# Patient Record
Sex: FEMALE | Race: WHITE
[De-identification: names, ages, dates, MRNs, and addresses within clinical notes are randomized per-mention and may not be internally consistent; named-entity substitution may affect disease eponyms.]

---

## 2017-10-03 ENCOUNTER — CHARTING TRANS (OUTPATIENT)
Dept: OTHER | Age: 37
End: 2017-10-03

## 2018-01-23 ENCOUNTER — CHARTING TRANS (OUTPATIENT)
Dept: OTHER | Age: 38
End: 2018-01-23

## 2018-01-25 ENCOUNTER — CHARTING TRANS (OUTPATIENT)
Dept: OTHER | Age: 38
End: 2018-01-25

## 2018-11-02 VITALS
RESPIRATION RATE: 18 BRPM | HEART RATE: 82 BPM | HEIGHT: 65 IN | WEIGHT: 160 LBS | BODY MASS INDEX: 26.66 KG/M2 | DIASTOLIC BLOOD PRESSURE: 60 MMHG | SYSTOLIC BLOOD PRESSURE: 104 MMHG | TEMPERATURE: 99.6 F

## 2022-11-14 ENCOUNTER — WALK IN (OUTPATIENT)
Dept: URGENT CARE | Age: 42
End: 2022-11-14

## 2022-11-14 VITALS
BODY MASS INDEX: 33.32 KG/M2 | HEIGHT: 65 IN | DIASTOLIC BLOOD PRESSURE: 80 MMHG | RESPIRATION RATE: 18 BRPM | HEART RATE: 77 BPM | WEIGHT: 200 LBS | OXYGEN SATURATION: 100 % | SYSTOLIC BLOOD PRESSURE: 120 MMHG | TEMPERATURE: 98.8 F

## 2022-11-14 DIAGNOSIS — R10.30 LOWER ABDOMINAL PAIN: Primary | ICD-10-CM

## 2022-11-14 DIAGNOSIS — J06.9 UPPER RESPIRATORY TRACT INFECTION, UNSPECIFIED TYPE: ICD-10-CM

## 2022-11-14 DIAGNOSIS — R19.7 DIARRHEA, UNSPECIFIED TYPE: ICD-10-CM

## 2022-11-14 PROCEDURE — 99202 OFFICE O/P NEW SF 15 MIN: CPT | Performed by: NURSE PRACTITIONER

## 2022-11-14 RX ORDER — ONDANSETRON 4 MG/1
TABLET, ORALLY DISINTEGRATING ORAL
COMMUNITY
Start: 2022-08-17

## 2022-11-14 ASSESSMENT — ENCOUNTER SYMPTOMS
SINUS PAIN: 1
CHILLS: 1
RHINORRHEA: 1
COUGH: 0
CHEST TIGHTNESS: 0
WHEEZING: 0
SHORTNESS OF BREATH: 0
NAUSEA: 1
HEADACHES: 1
APPETITE CHANGE: 1
SORE THROAT: 0
BACK PAIN: 1
VOMITING: 0
TROUBLE SWALLOWING: 0
LIGHT-HEADEDNESS: 1
ABDOMINAL PAIN: 1
DIARRHEA: 1
BLOOD IN STOOL: 0
FEVER: 0
FATIGUE: 1
SINUS PRESSURE: 1

## 2023-08-25 ENCOUNTER — OFFICE VISIT (OUTPATIENT)
Dept: OCCUPATIONAL MEDICINE | Age: 43
End: 2023-08-25
Attending: PHYSICIAN ASSISTANT

## 2023-08-28 ENCOUNTER — OFFICE VISIT (OUTPATIENT)
Dept: OCCUPATIONAL MEDICINE | Age: 43
End: 2023-08-28
Attending: PHYSICIAN ASSISTANT

## 2023-09-01 ENCOUNTER — OFFICE VISIT (OUTPATIENT)
Dept: OCCUPATIONAL MEDICINE | Age: 43
End: 2023-09-01
Attending: PHYSICIAN ASSISTANT

## 2023-09-05 ENCOUNTER — OFFICE VISIT (OUTPATIENT)
Dept: OCCUPATIONAL MEDICINE | Age: 43
End: 2023-09-05
Attending: PHYSICIAN ASSISTANT

## 2023-09-07 ENCOUNTER — OFFICE VISIT (OUTPATIENT)
Dept: OCCUPATIONAL MEDICINE | Age: 43
End: 2023-09-07
Attending: PHYSICIAN ASSISTANT

## 2023-09-09 ENCOUNTER — OFFICE VISIT (OUTPATIENT)
Dept: OCCUPATIONAL MEDICINE | Age: 43
End: 2023-09-09
Attending: FAMILY MEDICINE

## 2024-12-17 ENCOUNTER — TELEPHONE (OUTPATIENT)
Facility: LOCATION | Age: 44
End: 2024-12-17

## 2024-12-17 ENCOUNTER — OFFICE VISIT (OUTPATIENT)
Facility: LOCATION | Age: 44
End: 2024-12-17
Payer: COMMERCIAL

## 2024-12-17 DIAGNOSIS — J32.9 CHRONIC SINUSITIS, UNSPECIFIED LOCATION: Primary | ICD-10-CM

## 2024-12-17 DIAGNOSIS — R09.81 NASAL CONGESTION: ICD-10-CM

## 2024-12-17 PROCEDURE — 77011 CT SCAN FOR LOCALIZATION: CPT | Performed by: OTOLARYNGOLOGY

## 2024-12-17 PROCEDURE — 99204 OFFICE O/P NEW MOD 45 MIN: CPT | Performed by: OTOLARYNGOLOGY

## 2024-12-17 RX ORDER — FLUTICASONE PROPIONATE 50 MCG
2 SPRAY, SUSPENSION (ML) NASAL DAILY
Qty: 16 G | Refills: 3 | Status: SHIPPED | OUTPATIENT
Start: 2024-12-17

## 2024-12-17 RX ORDER — FLUTICASONE PROPIONATE 50 MCG
2 SPRAY, SUSPENSION (ML) NASAL DAILY
Qty: 16 G | Refills: 3 | Status: SHIPPED | OUTPATIENT
Start: 2024-12-17 | End: 2024-12-17

## 2024-12-17 RX ORDER — CEFUROXIME AXETIL 250 MG/1
250 TABLET ORAL 2 TIMES DAILY
Qty: 20 TABLET | Refills: 0 | Status: SHIPPED | OUTPATIENT
Start: 2024-12-17 | End: 2024-12-17

## 2024-12-17 RX ORDER — PREDNISONE 5 MG/1
5 TABLET ORAL DAILY
Qty: 14 TABLET | Refills: 0 | Status: SHIPPED | OUTPATIENT
Start: 2024-12-17

## 2024-12-17 RX ORDER — PREDNISONE 5 MG/1
5 TABLET ORAL DAILY
Qty: 14 TABLET | Refills: 0 | Status: SHIPPED | OUTPATIENT
Start: 2024-12-17 | End: 2024-12-17

## 2024-12-17 RX ORDER — CEFUROXIME AXETIL 250 MG/1
250 TABLET ORAL 2 TIMES DAILY
Qty: 20 TABLET | Refills: 0 | Status: SHIPPED | OUTPATIENT
Start: 2024-12-17

## 2024-12-17 NOTE — TELEPHONE ENCOUNTER
Pt coming in for an office visit visit today presenting sinus issues. Doctor would like Pt to under go a CT scan of the sinus. Per [employer] using Pt's insurance information. No authorization is required. Customer ID: [id number]

## 2024-12-18 NOTE — PROGRESS NOTES
Anita Steve is a 44 year old female.   Chief Complaint   Patient presents with    Nose Problem    Headache    Breathing Problem    Snoring     HPI:   She has had chronic nasal congestion.  She gets sinus pain and pressure.  She is taking pain medicines quite often.  She has snoring.  She has difficulty breathing through her nose.  She has had a few sinus infections.  She denies allergies.  She has tried Motrin Flonase saline and nasal strips.  Current Outpatient Medications   Medication Sig Dispense Refill    predniSONE 5 MG Oral Tab Take 1 tablet (5 mg total) by mouth daily. 14 tablet 0    fluticasone propionate 50 MCG/ACT Nasal Suspension 2 sprays by Nasal route daily. 16 g 3    cefuroxime 250 MG Oral Tab Take 1 tablet (250 mg total) by mouth 2 (two) times daily. 20 tablet 0    sucralfate 1 g Oral Tab Take 1 g by mouth 4 (four) times daily before meals and nightly.      famoTIDine 20 MG Oral Tab Take 20 mg by mouth 2 (two) times daily.      Ondansetron HCl 4 MG/5ML Oral Solution Take 4 mg by mouth once.      glycopyrrolate 2 MG Oral Tab Take 1 tablet (2 mg total) by mouth 2 (two) times daily. 180 tablet 2      History reviewed. No pertinent past medical history.   Social History:  Social History     Socioeconomic History    Marital status:    Tobacco Use    Smoking status: Every Day    Smokeless tobacco: Never   Vaping Use    Vaping status: Never Used   Substance and Sexual Activity    Alcohol use: Yes    Drug use: Never      Past Surgical History:   Procedure Laterality Date    Cholecystectomy      Colonoscopy      Hernia surgery           REVIEW OF SYSTEMS:   GENERAL HEALTH: feels well otherwise  GENERAL : denies fever, chills, sweats, weight loss, weight gain  SKIN: denies any unusual skin lesions or rashes  RESPIRATORY: denies shortness of breath with exertion  NEURO: denies headaches    EXAM:   There were no vitals taken for this visit.    System Findings Details   Constitutional  Overall appearance  - Normal.   Psychiatric  Orientation - Oriented to time, place, person & situation. Appropriate mood and affect.   Head/Face  Facial features -- Normal. Skull - Normal.   Eyes  Pupils equal ,round ,react to light and accomidate   Ears, Nose, Throat, Neck  Ears clear nose narrow nasal vault with deviated septum and turbinate hypertrophy some nasal valve collapse oropharynx clear neck no masses   Neurological  Memory - Normal. Cranial nerves - Cranial nerves II through XII grossly intact.   Lymph Detail  Submental. Submandibular. Anterior cervical. Posterior cervical. Supraclavicular.   Clinical indication: Chronic sinusitis    Exam title: CT guidance stereotactic localization of sinuses    Technique: ASR on mini CAT was used with a sinus CT protocol.  The patient was positioned seated upright with the head aligned and supported with malar retention.  A  film was used to ensure proper targeting.  Volume CT data was acquired.  Multiplanar reconstruction was performed on a viewing work stand to obtain axial and coronal images.  Images were manipulated to adjust contrast for bone window viewing.    Scan time: 20 seconds    Peak voltage: 120 K     Tube current: 48.30 MAS    Comparison to prior CT scans: None    Findings:  Frontal sinus are clear.  There is mucosal thickening with ostiomeatal obstruction of the maxillary sinuses.  There is mild mucosal thickening ethmoid sinus.  The sphenoid sinus are clear.  There is septal deviation to the left with ezra bullosa.  Diagnosis:  Severe ostiomeatal unit obstruction with ezra bullosa deviated nasal septum and sinusitis    ASSESSMENT AND PLAN:   1. Chronic sinusitis, unspecified location  CT of the sinus reviewed.  This shows ostiomeatal unit obstruction chronic sinusitis deviated septum.  She also has a narrow nasal vault.  She will take a longer course of Ceftin and prednisone she will add nasal saline and Flonase.  She will monitor her headache and breathing  symptoms and see me back.    2. Nasal congestion        The patient indicates understanding of these issues and agrees to the plan.    No follow-ups on file.    David Rebollar MD  12/17/2024  6:11 PM

## 2025-01-31 ENCOUNTER — OFFICE VISIT (OUTPATIENT)
Facility: LOCATION | Age: 45
End: 2025-01-31
Payer: COMMERCIAL

## 2025-01-31 DIAGNOSIS — J32.9 CHRONIC SINUSITIS, UNSPECIFIED LOCATION: Primary | ICD-10-CM

## 2025-01-31 DIAGNOSIS — J34.2 DEVIATED SEPTUM: ICD-10-CM

## 2025-01-31 PROCEDURE — 99214 OFFICE O/P EST MOD 30 MIN: CPT | Performed by: OTOLARYNGOLOGY

## 2025-01-31 NOTE — PROGRESS NOTES
Anita Steve is a 44 year old female.   Chief Complaint   Patient presents with    Sinus Problem     HPI:   She has chronic sinusitis.  She has chronic nasal airway obstruction.  She gets sinus pain and pressure.  She gets significant sinus pressure headaches especially over the right maxillary sinus.  She is tried numerous medications including antibiotics prednisone nasal strips Motrin Flonase and saline.    REVIEW OF SYSTEMS:   GENERAL HEALTH: feels well otherwise  GENERAL : denies fever, chills, sweats, weight loss, weight gain  SKIN: denies any unusual skin lesions or rashes  RESPIRATORY: denies shortness of breath with exertion  NEURO: denies headaches    EXAM:   There were no vitals taken for this visit.    System Findings Details   Constitutional  Overall appearance - Normal.   Psychiatric  Orientation - Oriented to time, place, person & situation. Appropriate mood and affect.   Head/Face  Facial features -- Normal. Skull - Normal.   Eyes  Pupils equal ,round ,react to light and accomidate   Ears, Nose, Throat, Neck  Ears clear nose turbinate hypertrophy with septal deviation to the left oropharynx cobblestoning neck no masses   Neurological  Memory - Normal. Cranial nerves - Cranial nerves II through XII grossly intact.   Lymph Detail  Submental. Submandibular. Anterior cervical. Posterior cervical. Supraclavicular.       ASSESSMENT AND PLAN:   1. Chronic sinusitis, unspecified location  CT of the sinus reviewed with the patient.  This shows deviated septum she has a large ezra bullosa of the right middle turbinate with ostiomeatal unit obstruction bilaterally.  She has mucosal thickening in the maxillary ethmoid sinuses and inferior turbinate hypertrophy.  Given the above constellation of symptoms and unresponsive to medical therapy is reasonable to proceed with endoscopic sinus surgery.  This would include max or antrostomies ethmoidectomy septoplasty and reduction of the turbinates along with ezra  bullosa.The risk benefits and alternatives of sinus surgery were explained to the patient.  The risks are to include but not limited to bleeding infection ocular brain injury scar band formation septal perforation and nonresolution of symptoms.      2. Deviated septum        The patient indicates understanding of these issues and agrees to the plan.    No follow-ups on file.    David Rebollar MD  1/31/2025  11:48 AM

## 2025-02-03 ENCOUNTER — TELEPHONE (OUTPATIENT)
Facility: LOCATION | Age: 45
End: 2025-02-03

## 2025-02-03 DIAGNOSIS — J32.4 CHRONIC PANSINUSITIS: ICD-10-CM

## 2025-02-03 DIAGNOSIS — J32.3 CHRONIC SPHENOIDAL SINUSITIS: ICD-10-CM

## 2025-02-03 DIAGNOSIS — J32.0 CHRONIC MAXILLARY SINUSITIS: ICD-10-CM

## 2025-02-03 DIAGNOSIS — J32.8 OTHER CHRONIC SINUSITIS: ICD-10-CM

## 2025-02-03 DIAGNOSIS — J32.2 CHRONIC ETHMOIDAL SINUSITIS: Primary | ICD-10-CM

## 2025-02-03 DIAGNOSIS — J34.2 DEVIATED NASAL SEPTUM: ICD-10-CM

## 2025-02-03 DIAGNOSIS — J34.3 HYPERTROPHY OF NASAL TURBINATES: ICD-10-CM

## 2025-02-04 RX ORDER — CEFUROXIME AXETIL 250 MG/1
250 TABLET ORAL 2 TIMES DAILY
Qty: 20 TABLET | Refills: 0 | Status: SHIPPED | OUTPATIENT
Start: 2025-02-04

## 2025-02-04 NOTE — TELEPHONE ENCOUNTER
Requested Prescriptions     Pending Prescriptions Disp Refills    cefuroxime 250 MG Oral Tab 20 tablet 0     Sig: Take 1 tablet (250 mg total) by mouth 2 (two) times daily.       FILLED- 12/17/24  LOV- 1/31/25    Surgery on 2/25/25

## 2025-02-18 NOTE — H&P
[facility]  History & Physical    Anita Steve Patient Status:  Hospital Outpatient Surgery    1980 MRN [id number]   Location [facility] SURGERY Attending David Rebollar MD   Hosp Day # 0 PCP Dev Whittaker MD     History of Present Illness:  Anita Steve is a(n) 44 year old female.  Patient with history of chronic sinusitis.  She has chronic nasal obstruction.  She gets sinus pain and pressure and headaches.    History:  Past Medical History:    IBS (irritable bowel syndrome)    Visual impairment     Past Surgical History:   Procedure Laterality Date    Cholecystectomy      Colonoscopy      Hernia surgery      Hysterectomy       History reviewed. No pertinent family history.   reports that she has quit smoking. Her smoking use included cigarettes. She has never used smokeless tobacco. She reports current alcohol use. She reports that she does not use drugs.    Allergies:  Allergies[1]    Home Medications:  Prescriptions Prior to Admission[2]    Physical Exam:   General: Alert, orientated x3.  Cooperative.  No apparent distress.  Vital Signs:  Ht 5' 4\" (1.626 m)   Wt 200 lb (90.7 kg)   BMI 34.33 kg/m²   HEENT ears clear nose turbinate hypertrophy with septal deviation to the left  Neck: No tenderness to palpitation.  Full range of motion to flexion and extension, lateral rotation and lateral flexion of cervical spine.  No JVD. Supple.   Lungs: Clear to auscultation bilaterally.  Cardiac: Regular rate and rhythm. No murmur.  Abdomen:  Soft, non-distended, non-tender, with no rebound or guarding.  No peritoneal signs. No ascites.  Liver is within normal limits.  Spleen is not palpable.    Extremities:  No lower extremity edema noted.  Without clubbing or cyanosis.    Skin: Normal texture and turgor.  Lymphatic:  No palpable cervical lymphadenopathy.  Neurologic: Cranial nerves are grossly intact.  Motor strength and sensory examination is grossly normal.  No focal neurologic  deficit.    Laboratory Data:      Impression and Plan:  There is no problem list on file for this patient.    CT of the sinus shows a deviated nasal septum.  She has a large ezra bullosa on the right middle turbinate with ostiomeatal unit obstruction.  She has mucosal thickening in the maxillary and ethmoid sinuses along with inferior turbinate hypertrophy    Chronic sinusitis with deviated nasal septum and nasal airway obstruction    Endoscopic sinus surgery to include max or antrostomies ethmoidectomies septoplasty reduction of the turbinates along with ezra bullosa.        David Rebollar MD  2/18/2025  7:52 AM         [1]   Allergies  Allergen Reactions    Clarithromycin SWELLING   [2]   No medications prior to admission.

## 2025-02-25 ENCOUNTER — ANESTHESIA (OUTPATIENT)
Dept: SURGERY | Facility: HOSPITAL | Age: 45
End: 2025-02-25
Payer: COMMERCIAL

## 2025-02-25 ENCOUNTER — HOSPITAL ENCOUNTER (OUTPATIENT)
Facility: HOSPITAL | Age: 45
Setting detail: HOSPITAL OUTPATIENT SURGERY
Discharge: HOME | End: 2025-02-25
Attending: OTOLARYNGOLOGY | Admitting: OTOLARYNGOLOGY
Payer: COMMERCIAL

## 2025-02-25 ENCOUNTER — ANESTHESIA EVENT (OUTPATIENT)
Dept: SURGERY | Facility: HOSPITAL | Age: 45
End: 2025-02-25
Payer: COMMERCIAL

## 2025-02-25 VITALS
OXYGEN SATURATION: 94 % | TEMPERATURE: 97 F | SYSTOLIC BLOOD PRESSURE: 114 MMHG | HEART RATE: 50 BPM | HEIGHT: 64 IN | BODY MASS INDEX: 38.24 KG/M2 | WEIGHT: 224 LBS | RESPIRATION RATE: 18 BRPM | DIASTOLIC BLOOD PRESSURE: 73 MMHG

## 2025-02-25 DIAGNOSIS — J32.8 OTHER CHRONIC SINUSITIS: ICD-10-CM

## 2025-02-25 DIAGNOSIS — J34.2 DEVIATED NASAL SEPTUM: ICD-10-CM

## 2025-02-25 DIAGNOSIS — J32.3 CHRONIC SPHENOIDAL SINUSITIS: ICD-10-CM

## 2025-02-25 DIAGNOSIS — J32.2 CHRONIC ETHMOIDAL SINUSITIS: ICD-10-CM

## 2025-02-25 DIAGNOSIS — J32.4 CHRONIC PANSINUSITIS: ICD-10-CM

## 2025-02-25 DIAGNOSIS — J32.0 CHRONIC MAXILLARY SINUSITIS: ICD-10-CM

## 2025-02-25 DIAGNOSIS — J34.3 HYPERTROPHY OF NASAL TURBINATES: ICD-10-CM

## 2025-02-25 PROCEDURE — 09BM0ZZ EXCISION OF NASAL SEPTUM, OPEN APPROACH: ICD-10-PCS | Performed by: OTOLARYNGOLOGY

## 2025-02-25 PROCEDURE — 61782 SCAN PROC CRANIAL EXTRA: CPT | Performed by: OTOLARYNGOLOGY

## 2025-02-25 PROCEDURE — 09BL7ZZ EXCISION OF NASAL TURBINATE, VIA NATURAL OR ARTIFICIAL OPENING: ICD-10-PCS | Performed by: OTOLARYNGOLOGY

## 2025-02-25 PROCEDURE — 30140 RESECT INFERIOR TURBINATE: CPT | Performed by: OTOLARYNGOLOGY

## 2025-02-25 PROCEDURE — 099Q8ZZ DRAINAGE OF RIGHT MAXILLARY SINUS, VIA NATURAL OR ARTIFICIAL OPENING ENDOSCOPIC: ICD-10-PCS | Performed by: OTOLARYNGOLOGY

## 2025-02-25 PROCEDURE — 31255 NSL/SINS NDSC W/TOT ETHMDCT: CPT | Performed by: OTOLARYNGOLOGY

## 2025-02-25 PROCEDURE — 31267 ENDOSCOPY MAXILLARY SINUS: CPT | Performed by: OTOLARYNGOLOGY

## 2025-02-25 PROCEDURE — 09TV8ZZ RESECTION OF LEFT ETHMOID SINUS, VIA NATURAL OR ARTIFICIAL OPENING ENDOSCOPIC: ICD-10-PCS | Performed by: OTOLARYNGOLOGY

## 2025-02-25 PROCEDURE — 09TU8ZZ RESECTION OF RIGHT ETHMOID SINUS, VIA NATURAL OR ARTIFICIAL OPENING ENDOSCOPIC: ICD-10-PCS | Performed by: OTOLARYNGOLOGY

## 2025-02-25 PROCEDURE — 8E09XBZ COMPUTER ASSISTED PROCEDURE OF HEAD AND NECK REGION: ICD-10-PCS | Performed by: OTOLARYNGOLOGY

## 2025-02-25 PROCEDURE — 30520 REPAIR OF NASAL SEPTUM: CPT | Performed by: OTOLARYNGOLOGY

## 2025-02-25 PROCEDURE — 099R8ZZ DRAINAGE OF LEFT MAXILLARY SINUS, VIA NATURAL OR ARTIFICIAL OPENING ENDOSCOPIC: ICD-10-PCS | Performed by: OTOLARYNGOLOGY

## 2025-02-25 RX ORDER — HYDROCODONE BITARTRATE AND ACETAMINOPHEN 5; 325 MG/1; MG/1
1-2 TABLET ORAL EVERY 4 HOURS PRN
Qty: 15 TABLET | Refills: 0 | Status: SHIPPED | OUTPATIENT
Start: 2025-02-25

## 2025-02-25 RX ORDER — MIDAZOLAM HYDROCHLORIDE 1 MG/ML
INJECTION INTRAMUSCULAR; INTRAVENOUS AS NEEDED
Status: DISCONTINUED | OUTPATIENT
Start: 2025-02-25 | End: 2025-02-25 | Stop reason: SURG

## 2025-02-25 RX ORDER — LABETALOL HYDROCHLORIDE 5 MG/ML
INJECTION, SOLUTION INTRAVENOUS AS NEEDED
Status: DISCONTINUED | OUTPATIENT
Start: 2025-02-25 | End: 2025-02-25 | Stop reason: SURG

## 2025-02-25 RX ORDER — ROCURONIUM BROMIDE 10 MG/ML
INJECTION, SOLUTION INTRAVENOUS AS NEEDED
Status: DISCONTINUED | OUTPATIENT
Start: 2025-02-25 | End: 2025-02-25 | Stop reason: SURG

## 2025-02-25 RX ORDER — PROCHLORPERAZINE EDISYLATE 5 MG/ML
5 INJECTION INTRAMUSCULAR; INTRAVENOUS EVERY 8 HOURS PRN
Status: DISCONTINUED | OUTPATIENT
Start: 2025-02-25 | End: 2025-02-25

## 2025-02-25 RX ORDER — MEPERIDINE HYDROCHLORIDE 25 MG/ML
12.5 INJECTION INTRAMUSCULAR; INTRAVENOUS; SUBCUTANEOUS AS NEEDED
Status: DISCONTINUED | OUTPATIENT
Start: 2025-02-25 | End: 2025-02-25

## 2025-02-25 RX ORDER — SODIUM CHLORIDE, SODIUM LACTATE, POTASSIUM CHLORIDE, CALCIUM CHLORIDE 600; 310; 30; 20 MG/100ML; MG/100ML; MG/100ML; MG/100ML
INJECTION, SOLUTION INTRAVENOUS CONTINUOUS
Status: DISCONTINUED | OUTPATIENT
Start: 2025-02-25 | End: 2025-02-25

## 2025-02-25 RX ORDER — LABETALOL HYDROCHLORIDE 5 MG/ML
5 INJECTION, SOLUTION INTRAVENOUS EVERY 5 MIN PRN
Status: DISCONTINUED | OUTPATIENT
Start: 2025-02-25 | End: 2025-02-25

## 2025-02-25 RX ORDER — ACETAMINOPHEN 500 MG
1000 TABLET ORAL ONCE AS NEEDED
Status: COMPLETED | OUTPATIENT
Start: 2025-02-25 | End: 2025-02-25

## 2025-02-25 RX ORDER — LIDOCAINE HYDROCHLORIDE 10 MG/ML
INJECTION, SOLUTION EPIDURAL; INFILTRATION; INTRACAUDAL; PERINEURAL AS NEEDED
Status: DISCONTINUED | OUTPATIENT
Start: 2025-02-25 | End: 2025-02-25 | Stop reason: SURG

## 2025-02-25 RX ORDER — HYDROCODONE BITARTRATE AND ACETAMINOPHEN 10; 325 MG/1; MG/1
2 TABLET ORAL ONCE AS NEEDED
Status: COMPLETED | OUTPATIENT
Start: 2025-02-25 | End: 2025-02-25

## 2025-02-25 RX ORDER — ONDANSETRON 2 MG/ML
INJECTION INTRAMUSCULAR; INTRAVENOUS AS NEEDED
Status: DISCONTINUED | OUTPATIENT
Start: 2025-02-25 | End: 2025-02-25 | Stop reason: SURG

## 2025-02-25 RX ORDER — HYDROMORPHONE HYDROCHLORIDE 1 MG/ML
INJECTION, SOLUTION INTRAMUSCULAR; INTRAVENOUS; SUBCUTANEOUS
Status: COMPLETED
Start: 2025-02-25 | End: 2025-02-25

## 2025-02-25 RX ORDER — ACETAMINOPHEN 500 MG
1000 TABLET ORAL ONCE
Status: DISCONTINUED | OUTPATIENT
Start: 2025-02-25 | End: 2025-02-25 | Stop reason: HOSPADM

## 2025-02-25 RX ORDER — SCOPOLAMINE 1 MG/3D
1 PATCH, EXTENDED RELEASE TRANSDERMAL ONCE
Status: DISCONTINUED | OUTPATIENT
Start: 2025-02-25 | End: 2025-02-25 | Stop reason: HOSPADM

## 2025-02-25 RX ORDER — GLYCOPYRROLATE 0.2 MG/ML
INJECTION, SOLUTION INTRAMUSCULAR; INTRAVENOUS AS NEEDED
Status: DISCONTINUED | OUTPATIENT
Start: 2025-02-25 | End: 2025-02-25 | Stop reason: SURG

## 2025-02-25 RX ORDER — LIDOCAINE HYDROCHLORIDE AND EPINEPHRINE 10; 10 MG/ML; UG/ML
INJECTION, SOLUTION INFILTRATION; PERINEURAL AS NEEDED
Status: DISCONTINUED | OUTPATIENT
Start: 2025-02-25 | End: 2025-02-25 | Stop reason: HOSPADM

## 2025-02-25 RX ORDER — MIDAZOLAM HYDROCHLORIDE 1 MG/ML
1 INJECTION INTRAMUSCULAR; INTRAVENOUS EVERY 5 MIN PRN
Status: DISCONTINUED | OUTPATIENT
Start: 2025-02-25 | End: 2025-02-25

## 2025-02-25 RX ORDER — HYDROMORPHONE HYDROCHLORIDE 1 MG/ML
0.6 INJECTION, SOLUTION INTRAMUSCULAR; INTRAVENOUS; SUBCUTANEOUS EVERY 5 MIN PRN
Status: DISCONTINUED | OUTPATIENT
Start: 2025-02-25 | End: 2025-02-25

## 2025-02-25 RX ORDER — DEXAMETHASONE SODIUM PHOSPHATE 4 MG/ML
VIAL (ML) INJECTION AS NEEDED
Status: DISCONTINUED | OUTPATIENT
Start: 2025-02-25 | End: 2025-02-25 | Stop reason: SURG

## 2025-02-25 RX ORDER — DIPHENHYDRAMINE HYDROCHLORIDE 50 MG/ML
12.5 INJECTION INTRAMUSCULAR; INTRAVENOUS AS NEEDED
Status: DISCONTINUED | OUTPATIENT
Start: 2025-02-25 | End: 2025-02-25

## 2025-02-25 RX ORDER — NEOSTIGMINE METHYLSULFATE 1 MG/ML
INJECTION INTRAVENOUS AS NEEDED
Status: DISCONTINUED | OUTPATIENT
Start: 2025-02-25 | End: 2025-02-25 | Stop reason: SURG

## 2025-02-25 RX ORDER — ONDANSETRON 2 MG/ML
INJECTION INTRAMUSCULAR; INTRAVENOUS
Status: COMPLETED
Start: 2025-02-25 | End: 2025-02-25

## 2025-02-25 RX ORDER — NALOXONE HYDROCHLORIDE 0.4 MG/ML
80 INJECTION, SOLUTION INTRAMUSCULAR; INTRAVENOUS; SUBCUTANEOUS AS NEEDED
Status: DISCONTINUED | OUTPATIENT
Start: 2025-02-25 | End: 2025-02-25

## 2025-02-25 RX ORDER — PREDNISONE 10 MG/1
10 TABLET ORAL DAILY
Qty: 10 TABLET | Refills: 0 | Status: SHIPPED | OUTPATIENT
Start: 2025-02-25

## 2025-02-25 RX ORDER — HYDROCODONE BITARTRATE AND ACETAMINOPHEN 10; 325 MG/1; MG/1
1 TABLET ORAL ONCE AS NEEDED
Status: COMPLETED | OUTPATIENT
Start: 2025-02-25 | End: 2025-02-25

## 2025-02-25 RX ORDER — ONDANSETRON 2 MG/ML
4 INJECTION INTRAMUSCULAR; INTRAVENOUS EVERY 6 HOURS PRN
Status: DISCONTINUED | OUTPATIENT
Start: 2025-02-25 | End: 2025-02-25

## 2025-02-25 RX ORDER — PROCHLORPERAZINE EDISYLATE 5 MG/ML
INJECTION INTRAMUSCULAR; INTRAVENOUS
Status: COMPLETED
Start: 2025-02-25 | End: 2025-02-25

## 2025-02-25 RX ORDER — ALBUTEROL SULFATE 0.83 MG/ML
2.5 SOLUTION RESPIRATORY (INHALATION) AS NEEDED
Status: DISCONTINUED | OUTPATIENT
Start: 2025-02-25 | End: 2025-02-25

## 2025-02-25 RX ORDER — LIDOCAINE HYDROCHLORIDE 40 MG/ML
INJECTION, SOLUTION RETROBULBAR AS NEEDED
Status: DISCONTINUED | OUTPATIENT
Start: 2025-02-25 | End: 2025-02-25 | Stop reason: SURG

## 2025-02-25 RX ORDER — HYDROMORPHONE HYDROCHLORIDE 1 MG/ML
0.4 INJECTION, SOLUTION INTRAMUSCULAR; INTRAVENOUS; SUBCUTANEOUS EVERY 5 MIN PRN
Status: DISCONTINUED | OUTPATIENT
Start: 2025-02-25 | End: 2025-02-25

## 2025-02-25 RX ORDER — PHENOL 1.4 %
10 AEROSOL, SPRAY (ML) MUCOUS MEMBRANE AS DIRECTED
COMMUNITY
Start: 2024-08-27

## 2025-02-25 RX ORDER — HYDROMORPHONE HYDROCHLORIDE 1 MG/ML
0.2 INJECTION, SOLUTION INTRAMUSCULAR; INTRAVENOUS; SUBCUTANEOUS EVERY 5 MIN PRN
Status: DISCONTINUED | OUTPATIENT
Start: 2025-02-25 | End: 2025-02-25

## 2025-02-25 RX ADMIN — DEXAMETHASONE SODIUM PHOSPHATE 8 MG: 4 MG/ML VIAL (ML) INJECTION at 10:39:00

## 2025-02-25 RX ADMIN — MIDAZOLAM HYDROCHLORIDE 2 MG: 1 INJECTION INTRAMUSCULAR; INTRAVENOUS at 10:19:00

## 2025-02-25 RX ADMIN — SODIUM CHLORIDE, SODIUM LACTATE, POTASSIUM CHLORIDE, CALCIUM CHLORIDE: 600; 310; 30; 20 INJECTION, SOLUTION INTRAVENOUS at 11:37:00

## 2025-02-25 RX ADMIN — NEOSTIGMINE METHYLSULFATE 3 MG: 1 INJECTION INTRAVENOUS at 11:29:00

## 2025-02-25 RX ADMIN — ONDANSETRON 4 MG: 2 INJECTION INTRAMUSCULAR; INTRAVENOUS at 11:29:00

## 2025-02-25 RX ADMIN — GLYCOPYRROLATE 0.4 MG: 0.2 INJECTION, SOLUTION INTRAMUSCULAR; INTRAVENOUS at 11:29:00

## 2025-02-25 RX ADMIN — LIDOCAINE HYDROCHLORIDE 30 MG: 10 INJECTION, SOLUTION EPIDURAL; INFILTRATION; INTRACAUDAL; PERINEURAL at 10:29:00

## 2025-02-25 RX ADMIN — LABETALOL HYDROCHLORIDE 10 MG: 5 INJECTION, SOLUTION INTRAVENOUS at 11:30:00

## 2025-02-25 RX ADMIN — LIDOCAINE HYDROCHLORIDE 4 ML: 40 INJECTION, SOLUTION RETROBULBAR at 10:33:00

## 2025-02-25 RX ADMIN — ROCURONIUM BROMIDE 40 MG: 10 INJECTION, SOLUTION INTRAVENOUS at 10:30:00

## 2025-02-25 RX ADMIN — SODIUM CHLORIDE, SODIUM LACTATE, POTASSIUM CHLORIDE, CALCIUM CHLORIDE: 600; 310; 30; 20 INJECTION, SOLUTION INTRAVENOUS at 10:19:00

## 2025-02-25 NOTE — DISCHARGE INSTRUCTIONS
Nasal Sinus and Septal Surgery (Septoplasty) Instructions   Postoperative period  Pain is normal after nasal surgery, and is usually well-controlled with medication.  You should keep your head elevated to keep swelling and pain to a minimum.  Please avoid non-steroidal anti-inflammatory medications / NSAIDs (ex. Ibuprofen, Motrin, Advil, Aleve, etc.) and aspirin for 2 weeks after surgery, as these can increase bleeding.  Stuffiness is to be expected due to swelling in the nose from surgery, presence of packing or splints in the nose, and increase in nasal secretions due to inflammation from surgery.  This will improve with time.  Nasal bleeding is normal after septal surgery. 2-3 hours after surgery you may see an increase in bleeding as the medications used in surgery wear off.  A gauze dressing will be placed under your nose to absorb any blood.  It may need to be changed as frequently as every 10-15 minutes for 2-3 hours after surgery and then less frequently after throughout the next several days.     Activity  You will need to rest (off work or school) for 1 week after surgery. No vigorous activity should be performed for 2 weeks.      Diet  Eat a bland, light diet for about 24 hours after your surgery.  After that, a regular diet may usually be resumed.  Nausea experienced after taking pain medications can worsen if they are taken on an empty stomach.  It is preferable to take prescription pain medications with small amounts of food.    Wound care  Packing and/or splints may be placed in the nose by your surgeon.  These will be removed by your surgeon in the office.  You may clean any crusted blood from around the nostrils gently with hydrogen peroxide mixed with water on a Q-tip.  After surgery use saline spray in the nose frequently (at least 5 times daily).  Nasal saline spray can be purchased without a prescription, and examples include: Ocean Spray®, Ayr® Saline Nasal Mist, and generic equivalents.  Avoid  blowing the nose for at least 48 hours after surgery, and sneeze with your mouth open to keep pressure off of the nose, which can increase bleeding and pain.    Medication  It is important to fill all prescriptions written by your surgeon.   These usually include a pain reliever and antibiotic.  Please do not drive, operate machinery, or drink alcohol within at least 8 hours of taking prescription pain medications.    Follow-up    Appointments are usually planned for one week after surgery, but you may be asked to return sooner if any packing needs to be removed.  Call the office to schedule your appointment, or with any questions.     You have been given a prescription for Norco 5/325  Norco was Given to you at: 1:30 pm  Next dose due: 5:30  Take this medication as directed  This medication contains Tylenol (acetaminophen)  Do not take additional Tylenol while taking Norco    Norco is a Narcotic and can be constipating or upset your stomach  Don't take Norco on an empty stomach  Drink plenty of water  Alcoholic beverages should be avoided while taking narcotics

## 2025-02-25 NOTE — ANESTHESIA PROCEDURE NOTES
Airway  Date/Time: 2/25/2025 10:33 AM  Urgency: elective      General Information and Staff    Patient location during procedure: OR  Anesthesiologist: Monroe Bolivar MD  Performed: anesthesiologist   Performed by: Monroe Bolivar MD  Authorized by: Monroe Bolivar MD      Indications and Patient Condition  Indications for airway management: anesthesia  Sedation level: deep  Preoxygenated: yes  Patient position: sniffing  Mask difficulty assessment: 2 - vent by mask + OA or adjuvant +/- NMBA    Final Airway Details  Final airway type: endotracheal airway      Successful airway: ETT  Cuffed: yes   Successful intubation technique: Video laryngoscopy  Endotracheal tube insertion site: oral  Blade: GlideScope  Blade size: #4  ETT size (mm): 7.0    Cormack-Lehane Classification: grade IIB - view of arytenoids or posterior of glottis only  Placement verified by: capnometry   Measured from: lips  ETT to lips (cm): 20  Number of attempts at approach: 1  Number of other approaches attempted: 1    Other Attempts  Unsuccessful attempted airways: endotracheal tube  Unsuccessful attempted endotracheal techniques: direct laryngoscopy    Additional Comments  MAC 3 x1, Grade III VCV, anterior airway. Changed to glidescope. Vital signs stable throughout.

## 2025-02-25 NOTE — ANESTHESIA PREPROCEDURE EVALUATION
PRE-OP EVALUATION    Patient Name: Anita PATRICK Steve    Admit Diagnosis: Chronic ethmoidal sinusitis [J32.2]  Chronic sphenoidal sinusitis [J32.3]  Chronic maxillary sinusitis [J32.0]  Hypertrophy of nasal turbinates [J34.3]  Chronic pansinusitis [J32.4]  Other chronic sinusitis [J32.8]  Deviated nasal septum [J34.2]    Pre-op Diagnosis: Chronic ethmoidal sinusitis [J32.2]  Chronic sphenoidal sinusitis [J32.3]  Chronic maxillary sinusitis [J32.0]  Hypertrophy of nasal turbinates [J34.3]  Chronic pansinusitis [J32.4]  Other chronic sinusitis [J32.8]  Deviated nasal septum [J34.2]    Bilateral Sinus Endoscopic Ethmoidectomy; Bilateral Maxillary Antrostomy with Tissue Removal; Nasal Septoplasty; Bilateral Out Fracture of Inferior Turbinates; Bilateral Submucous Resection of Inferior Turbinates; Medtronic ENT Navigation Stealth    Anesthesia Procedure: Bilateral Sinus Endoscopic Ethmoidectomy; Bilateral Maxillary Antrostomy with Tissue Removal; Nasal Septoplasty; Bilateral Out Fracture of Inferior Turbinates; Bilateral Submucous Resection of Inferior Turbinates; [employer] ENT Navigation Stealth (Bilateral: Nose)  . (Bilateral)    Surgeons and Role:     * David Rebollar MD - Primary    Pre-op vitals reviewed.  Temp: 97.8 °F (36.6 °C)  Pulse: 65  Resp: 16  BP: 136/80  SpO2: 100 %  Body mass index is 38.45 kg/m².    Current medications reviewed.  Hospital Medications:   [Transfer Hold] acetaminophen (Tylenol Extra Strength) tab 1,000 mg  1,000 mg Oral Once    [Transfer Hold] scopolamine (Transderm-Scop) 1 MG/3DAYS patch 1 patch  1 patch Transdermal Once    lactated ringers infusion   Intravenous Continuous    [COMPLETED] ceFAZolin (Ancef) 2g in 10mL IV syringe premix  2 g Intravenous Once       Outpatient Medications:   Prescriptions Prior to Admission[1]    Allergies: Clarithromycin      Anesthesia Evaluation    Patient summary reviewed.    Anesthetic Complications  (-) history of anesthetic complications          GI/Hepatic/Renal      (-) GERD                      (+) irritable bowel syndrome     Cardiovascular        Exercise tolerance: good     MET: >4                             (-) angina              Endo/Other    Negative endo/other ROS.                              Pulmonary               (-) shortness of breath            Neuro/Psych                                      Past Surgical History:   Procedure Laterality Date    Cholecystectomy      Colonoscopy      Hernia surgery      Hysterectomy  2023     Social History     Socioeconomic History    Marital status:    Tobacco Use    Smoking status: Former     Types: Cigarettes    Smokeless tobacco: Never   Vaping Use    Vaping status: Never Used   Substance and Sexual Activity    Alcohol use: Yes     Comment: occ    Drug use: Never     History   Drug Use Unknown     Available pre-op labs reviewed.               Airway      Mallampati: III  Mouth opening: 3 FB  TM distance: < 4 cm  Neck ROM: full Cardiovascular      Rhythm: regular  Rate: normal     Dental             Pulmonary      Breath sounds clear to auscultation bilaterally.               Other findings              ASA: 2   Plan: general  NPO status verified and patient meets guidelines.    Post-procedure pain management plan discussed with surgeon and patient.      Plan/risks discussed with: patient and significant other                Present on Admission:  **None**             [1]   Medications Prior to Admission   Medication Sig Dispense Refill Last Dose/Taking    Melatonin 10 MG Oral Tab 10 mg As Directed.   2/23/2025    cefuroxime 250 MG Oral Tab Take 1 tablet (250 mg total) by mouth 2 (two) times daily. 20 tablet 0 2/4/2025    fluticasone propionate 50 MCG/ACT Nasal Suspension 2 sprays by Nasal route daily. (Patient taking differently: 2 sprays by Nasal route daily as needed.) 16 g 3 More than a month    Ondansetron HCl 4 MG/5ML Oral Solution Take 5 mL (4 mg total) by mouth once.   More than a month

## 2025-02-25 NOTE — OPERATIVE REPORT
Anita PATRICK Steve Location: [facility] [id number] MRN [id number]   Admission Date 2/25/2025 Operation Date 2/25/2025   Attending Physician David Rebollar MD Operating Physician David Rebollar MD       OPERATIVE REPORT   PREOPERATIVE DIAGNOSIS:   1.  Chronic sinusitis  2. Inferior turbinate hypertrophy.   3. Deviated nasal septum  POSTOPERATIVE DIAGNOSIS:   1.  Chronic sinusitis  2. Inferior turbinate hypertrophy.   3. Deviated nasal septum  PROCEDURE PERFORMED:   1.  Bilateral endoscopic total ethmoidectomy  2.  Bilateral endoscopic maxillary antrostomy with tissue removal  3. Outfracture and shaver submucous reduction of inferior turbinates.   4. Medtronic fusion guidance system  5. Septoplasty  ANESTHESIA: General endotracheal.   Procedure and Findings:  After satisfactory general endotracheal anesthesia induction the patient was prepared for the procedure.  1% lidocaine with epinephrine was injected at the lateral nasal wall.  4% cocaine packs were placed to both sides of nose.  There is then prepped and draped in usual sterile fashion.  The [employer] guidance headpiece was placed.  It was calibrated to the shaver and used throughout the case.    Attention was turned the left-hand side.  The 30 degree endoscope was utilized.  The middle turbinate was gently moved medially using a freer elevator.  The backbiting forcep and shaver were used to take down the uncinate process and create a max antrostomy.  Any sinus contents were removed utilizing a curved suction and curved polyp forceps.  Attention was turned to ethmoidectomy.  This was performed in the straight suction straight shaver and 0 to endoscope.  I proceeded through the ethmoidal bulla to the posterior ethmoid air cells taken down ethmoidal air cells as I went.  The Medtronic guidance system was important during this part of the case to identify landmarks including the lamina and the fovea.  Attention was turned to the right-hand side the same procedure  performed.  In this fashion a max antrostomy and ethmoidectomy was performed on the right.  A #15-blade was used to make an incision at the nasal septum. A mucoperichondrial flap was then raised using a Vivian elevator. An incision was made at the bony cartilage junction with the Vivian elevator, then a mucoperiosteal flap was raised on the other side. Deviated nasal vomer and ethmoid bone were removed using Imer forceps. Inferiorly, the maxillary crest spur was removed with an osteotome.  The patient had bleeding at the incision site superiorly on the left side of the septum.  This was cauterized with suction Bovie without difficulty.  The septal mucosal edges were then closed with the septal stapler. A small drainage incision was made posteriorly.   Each inferior turbinate was outfractured with a #7 osteotome.  An incision was made in each inferior turbinate.  A mucosal periosteal flap was raised using a Vivian elevator.  The shaver was then used to remove both bone and submucosa thereby reducing the size of the turbinates.  Packing was placed laterally's middle turbinate.  The patient was then awoken extubated and transferred recovery room in stable condition.  FINDINGS: Patient had erythematous swollen mucosa bilaterally.  She had a large ezra bullosa on the lateral half of this was removed using the shaver on the right.  Patient had septal deviation to the left.    David Rebollar MD

## 2025-02-25 NOTE — INTERVAL H&P NOTE
Pre-op Diagnosis: Chronic ethmoidal sinusitis [J32.2]  Chronic sphenoidal sinusitis [J32.3]  Chronic maxillary sinusitis [J32.0]  Hypertrophy of nasal turbinates [J34.3]  Chronic pansinusitis [J32.4]  Other chronic sinusitis [J32.8]  Deviated nasal septum [J34.2]    The above referenced H&P was reviewed by David Rebollar MD on 2/25/2025, the patient was examined and no significant changes have occurred in the patient's condition since the H&P was performed.  I discussed with the patient and/or legal representative the potential benefits, risks and side effects of this procedure; the likelihood of the patient achieving goals; and potential problems that might occur during recuperation.  I discussed reasonable alternatives to the procedure, including risks, benefits and side effects related to the alternatives and risks related to not receiving this procedure.  We will proceed with procedure as planned.

## 2025-02-25 NOTE — ANESTHESIA POSTPROCEDURE EVALUATION
[facility] Patient Status:  Hospital Outpatient Surgery   Age/Gender 44 year old female MRN [id number]   Location [facility] SURGERY Attending David Rebollar MD   Hosp Day # 0 PCP Dev Whittaker MD       Anesthesia Post-op Note    Bilateral Sinus Endoscopic Ethmoidectomy; Bilateral Maxillary Antrostomy with Tissue Removal; Nasal Septoplasty; Bilateral Out Fracture of Inferior Turbinates; Bilateral Submucous Resection of Inferior Turbinates; [employer] ENT Navigation Stealth    Procedure Summary       Date: 02/25/25 Room / Location:  MAIN OR 02 / [facility] MAIN OR    Anesthesia Start: 1019 Anesthesia Stop:     Procedures:       Bilateral Sinus Endoscopic Ethmoidectomy; Bilateral Maxillary Antrostomy with Tissue Removal; Nasal Septoplasty; Bilateral Out Fracture of Inferior Turbinates; Bilateral Submucous Resection of Inferior Turbinates; [employer] ENT Navigation Stealth (Bilateral: Nose)      . (Bilateral) Diagnosis:       Chronic ethmoidal sinusitis      Chronic sphenoidal sinusitis      Chronic maxillary sinusitis      Hypertrophy of nasal turbinates      Chronic pansinusitis      Other chronic sinusitis      Deviated nasal septum      (Chronic ethmoidal sinusitis [J32.2]Chronic sphenoidal sinusitis [J32.3]Chronic maxillary sinusitis [J32.0]Hypertrophy of nasal turbinates [J34.3]Chronic pansinusitis [J32.4]Other chronic sinusitis [J32.8]Deviated nasal septum [J34.2])    Surgeons: David Rebollar MD Anesthesiologist: Monroe Bolivar MD    Anesthesia Type: general ASA Status: 2            Anesthesia Type: general    Vitals Value Taken Time   /112 02/25/25 1142   Temp 97.6 degrees F 02/25/25 1142   Pulse 81 02/25/25 1142   Resp 14 02/25/25 1142   SpO2 95% 02/25/25 1142           Patient Location: PACU    Anesthesia Type: general    Airway Patency: patent and extubated    Postop Pain Control: adequate    Mental Status: mildly sedated but able to meaningfully participate in the  post-anesthesia evaluation    Nausea/Vomiting: none    Cardiopulmonary/Hydration status: stable euvolemic    Complications: no apparent anesthesia related complications    Postop vital signs: stable    Dental Exam: Unchanged from Preop    Patient to be discharged from PACU when criteria met.

## 2025-02-27 ENCOUNTER — MED REC SCAN ONLY (OUTPATIENT)
Facility: LOCATION | Age: 45
End: 2025-02-27

## 2025-03-04 ENCOUNTER — OFFICE VISIT (OUTPATIENT)
Facility: LOCATION | Age: 45
End: 2025-03-04
Payer: COMMERCIAL

## 2025-03-04 DIAGNOSIS — J32.4 CHRONIC PANSINUSITIS: Primary | ICD-10-CM

## 2025-03-04 PROCEDURE — 31237 NSL/SINS NDSC SURG BX POLYPC: CPT | Performed by: STUDENT IN AN ORGANIZED HEALTH CARE EDUCATION/TRAINING PROGRAM

## 2025-03-04 NOTE — PROGRESS NOTES
POSTOPERATIVE VISIT    Anita is a pleasant 44-year-old F who underwent mini ESS, septoturb with Dr. Rebollar on 2/25. The patient returns for post-op follow-up.  She complains of soreness.  She has been performing nasal irrigations 5-6 times daily.    ROS: Negative except as stated in HPI    Procedure: Postoperative sinus debridement  Surgeon: Lidya Price MD, CARLITO  Verbal consent was obtained for the diagnostic procedure. Patient's nose was decongested and anesthetized with oxymetazoline and lidocaine sprays. After adequate time was allowed for these to take effect, bilateral nasal endoscopy was performed.   Findings: Rigid 0 and 30 degree endoscopes were used.    Right nasal cavity:   Crusting visualized lateral to middle turbinate, removed, old clot suctioned, maxillary and ethmoid sinuses patent    Left nasal cavity:   Crusting and old clot visualized lateral to middle turbinate, removed, maxillary and ethmoid sinuses patent      Plan:  -Continue nasal irrigations  -Follow-up next week with Dr. Rebollar as scheduled    Lidya Price MD, CARLITO  Facial Plastic & Reconstructive Surgery, Otolaryngology-Head & Neck Surgery  [facility]

## 2025-03-12 ENCOUNTER — OFFICE VISIT (OUTPATIENT)
Facility: LOCATION | Age: 45
End: 2025-03-12
Payer: COMMERCIAL

## 2025-03-12 DIAGNOSIS — J32.4 CHRONIC PANSINUSITIS: Primary | ICD-10-CM

## 2025-03-12 PROCEDURE — 31237 NSL/SINS NDSC SURG BX POLYPC: CPT | Performed by: OTOLARYNGOLOGY

## 2025-03-12 NOTE — PROGRESS NOTES
This patient is status post endoscopic sinus procedure 2/25/2025 in for her first postoperative debridement procedure.  Patient was topically anesthetized with anesthetic spray.  She was debrided using the 0 degree sinus scope bilaterally.  Patient tolerated the procedure well.  She was given  routine post debridement instructions.  She will follow-up in 2 weeks.  She is postop sinus surgery septoplasty.  She is still getting some pain on the left-hand side but I see no significant evidence of infection.  She will continue with nasal saline and add Flonase.

## (undated) DEVICE — NEEDLE SPNL 25GA L3.5IN BLU QNCKE STYL DISP

## (undated) DEVICE — TRAP SPEC UNIV ULT FOR INTELLICART SYS

## (undated) DEVICE — PACK CDS SINUS

## (undated) DEVICE — SPLINT INTNSL W0.6XL2IN CHITOSAN POLYMR

## (undated) DEVICE — GLOVE SUR 7.5 SENSICARE PI PIP CRM PWD F

## (undated) DEVICE — GOWN,SIRUS,FABRNF,XL,20/CS: Brand: MEDLINE

## (undated) DEVICE — DALE NASAL DRESSING HOLDER, FITS MOST: Brand: DALE NASAL DRESSING HOLDER

## (undated) DEVICE — 40400 ADULT HEAD REST: Brand: 40400 ADULT HEAD REST

## (undated) DEVICE — ENTACT SEPTAL STAPLER 3 PACK: Brand: ENT SINUS

## (undated) DEVICE — PATIENT TRACKER 9734887XOM NON-INVASIVE

## (undated) DEVICE — SYRINGE MED 10ML LL CTRL W/ FNGR GRP CLR BRL

## (undated) DEVICE — ELECTROSURGICAL SUCTION COAGULATOR 10FR

## (undated) DEVICE — SOLUTION IRRIG 1000ML 0.9% NACL USP BTL

## (undated) DEVICE — SLEEVE COMPR MD KNEE LEN SGL USE KENDALL SCD

## (undated) DEVICE — BLADE 1882040 5PK INFERIOR TURB 2MM

## (undated) DEVICE — INSTRUMENT TRACKER 9733533XOM ENT 1PK

## (undated) DEVICE — SKIN REG/FINE DUAL MARKER, RULER, LABELS: Brand: MEDLINE

## (undated) DEVICE — BLADE 1884080EM TRICUT 4MMX13CM M4 ROHS: Brand: FUSION®

## (undated) NOTE — LETTER
25          Anita PATRICK Steve  :  1980      To Whom It May Concern:    This patient had surgery on the . Please excuse patient from the  through . Patient may return to work on the  with light restrictions. If this office may be of further assistance, please do not hesitate to contact us. [phone]      Sincerely,  Lidya Price MD